# Patient Record
Sex: FEMALE | Race: WHITE | NOT HISPANIC OR LATINO | URBAN - NONMETROPOLITAN AREA
[De-identification: names, ages, dates, MRNs, and addresses within clinical notes are randomized per-mention and may not be internally consistent; named-entity substitution may affect disease eponyms.]

---

## 2019-02-21 ENCOUNTER — IMPORTED ENCOUNTER (OUTPATIENT)
Dept: URBAN - NONMETROPOLITAN AREA CLINIC 1 | Facility: CLINIC | Age: 69
End: 2019-02-21

## 2019-02-21 PROBLEM — H33.8: Noted: 2019-02-21

## 2019-02-21 PROBLEM — H35.413: Noted: 2019-02-21

## 2019-02-21 PROBLEM — H04.123: Noted: 2019-02-21

## 2019-02-21 PROBLEM — H43.813: Noted: 2019-02-21

## 2019-02-21 PROBLEM — H25.13: Noted: 2019-02-21

## 2019-02-21 PROBLEM — H52.4: Noted: 2019-02-21

## 2019-02-21 PROCEDURE — S0621 ROUTINE OPHTHALMOLOGICAL EXA: HCPCS

## 2019-02-21 PROCEDURE — 92310 CONTACT LENS FITTING OU: CPT

## 2019-02-21 NOTE — PATIENT DISCUSSION
Presbyopia OU-  Discussed refractive status in detail with patient. -  New glasses and contact Rx given today. -  Discussed proper lens care replacement and hygiene. Cataracts OU:-  Educated patient on findings today. -  Mild progression noted but not visually signiifcant. -  No treatment indicated at this time. -  Will continue to monitor for now. 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 -  Appears to be stable at this time without changes. -  Signs/symptoms of RD discussed with patient today. She is to call or come in ASAP if changes are noted from today. -  Continue to monitor closely. -  Need to obtain Optos next visit. PVD OU:-  Discussed findings of exam in detail with the patient. -  The risk of retinal detachment in patients with PVDs was discussed with the patient and the warning signs of retinal detachment were carefully reviewed with the patient. Patient to call or come in ASAP if changes are noted from today's vision. -  The patient was warned to return to the office or contact the ophthalmologist on call immediately if they experience signs of retinal detachment. STEW OU -  Discussed diagnosis in detail with patient-  Continue Lotemax before putting in contacts and after takingcontacts out-  Continue Soothe OU as needed-  Continue to monitor; 's Notes:  02/21/19DFE  01/11/16

## 2020-02-11 ENCOUNTER — IMPORTED ENCOUNTER (OUTPATIENT)
Dept: URBAN - NONMETROPOLITAN AREA CLINIC 1 | Facility: CLINIC | Age: 70
End: 2020-02-11

## 2020-02-11 PROCEDURE — S0621 ROUTINE OPHTHALMOLOGICAL EXA: HCPCS

## 2020-02-11 PROCEDURE — 92310 CONTACT LENS FITTING OU: CPT

## 2020-02-11 NOTE — PATIENT DISCUSSION
Presbyopia OU-  Discussed refractive status in detail with patient. -  New glasses and contact Rx given today. -  Discussed proper lens care replacement and hygiene. Cataracts OU:-  Educated patient on findings today. -  Mild progression noted but not visually signiifcant. -  No treatment indicated at this time. -  Will continue to monitor for now. 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 -  Appears to be stable at this time without changes. -  Signs/symptoms of RD discussed with patient today. She is to call or come in ASAP if changes are noted from today. -  Continue to monitor closely. -  Need to obtain Optos next visit. PVD OU:-  Discussed findings of exam in detail with the patient. -  The risk of retinal detachment in patients with PVDs was discussed with the patient and the warning signs of retinal detachment were carefully reviewed with the patient. Patient to call or come in ASAP if changes are noted from today's vision. -  The patient was warned to return to the office or contact the ophthalmologist on call immediately if they experience signs of retinal detachment. STEW OU -  Discussed diagnosis in detail with patient-  Continue Lotemax before putting in contacts and after takingcontacts out-  Continue Soothe OU as needed-  Continue to monitor; 's Notes: MR 02/11/2020DFE  01/11/16

## 2020-09-16 ENCOUNTER — IMPORTED ENCOUNTER (OUTPATIENT)
Dept: URBAN - NONMETROPOLITAN AREA CLINIC 1 | Facility: CLINIC | Age: 70
End: 2020-09-16

## 2020-09-16 PROCEDURE — 92014 COMPRE OPH EXAM EST PT 1/>: CPT

## 2020-09-16 NOTE — PATIENT DISCUSSION
Cataract(s)-Visually significant cataract OU .-Cataract(s) causing symptomatic impairment of visual function not correctable with a tolerable change in glasses or contact lenses lighting or non-operative means resulting in specific activity limitations and/or participation restrictions including but not limited to reading viewing television driving or meeting vocational or recreational needs. -Expectation is clearer vision and functional improvement in symptoms as well as reduced glare disability after cataract removal.-Order IOLMaster and OPD today. -Recommend Fidencio/Trad based on today's OPD testing and lifestyle questionnaire.-All questions were answered regarding surgery including pre and post-op medications appointments activity restrictions and anesthetic usage.-The risks benefits and alternatives and special risk factors for the patient were discussed in detail including but not limited to: bleeding infection retinal detachment vitreous loss problems with the implant and possible need for additional surgery.-Although rare the possibility of complete vision loss was discussed.-The possible need for glasses post-operatively was discussed.-Order medical clearance exam based on history of age -Patient elects to proceed with cataract surgery OD . Will schedule at patient's convenience and re-evaluate OS  in the future. Discussed all lens options.  She elects Stand/Trad; 's Notes: MR 02/11/2020DFE  01/11/16

## 2020-09-28 PROBLEM — H25.13: Noted: 2020-09-28

## 2020-09-28 PROBLEM — H33.8: Noted: 2020-12-29

## 2020-10-30 ENCOUNTER — IMPORTED ENCOUNTER (OUTPATIENT)
Dept: URBAN - NONMETROPOLITAN AREA CLINIC 1 | Facility: CLINIC | Age: 70
End: 2020-10-30

## 2020-10-30 PROBLEM — M19.90: Noted: 2020-10-30

## 2020-10-30 PROBLEM — Z01.818: Noted: 2020-10-30

## 2020-10-30 PROBLEM — K21.9: Noted: 2020-10-30

## 2020-10-30 PROCEDURE — 99214 OFFICE O/P EST MOD 30 MIN: CPT

## 2020-10-30 NOTE — PATIENT DISCUSSION
Medical Clearance-Medical clearance done today. -No outstanding concerns that would preclude surgery.-Patient is cleared to proceed with scheduled surgery.; 's Notes: MR 02/11/2020DFE  01/11/16

## 2020-11-10 ENCOUNTER — IMPORTED ENCOUNTER (OUTPATIENT)
Dept: URBAN - NONMETROPOLITAN AREA CLINIC 1 | Facility: CLINIC | Age: 70
End: 2020-11-10

## 2020-11-10 PROCEDURE — 99024 POSTOP FOLLOW-UP VISIT: CPT

## 2020-11-10 PROCEDURE — 66984 XCAPSL CTRC RMVL W/O ECP: CPT

## 2020-11-10 PROCEDURE — 92136 OPHTHALMIC BIOMETRY: CPT

## 2020-11-10 PROCEDURE — 66999 UNLISTED PX ANT SEGMENT EYE: CPT

## 2020-11-10 NOTE — PATIENT DISCUSSION
Cataract(s)-Visually significant cataract OS . -Cataract(s) causing symptomatic impairment of visual function not correctable with a tolerable change in glasses or contact lenses lighting or non-operative means resulting in specific activity limitations and/or participation restrictions including but not limited to reading viewing television driving or meeting vocational or recreational needs. -Expectation is clearer vision and functional improvement in symptoms as well as reduced glare disability after cataract removal.-Recommend Stand w/ Limbal Relaxing Incisions based on previous OPD testing and lifestyle questionnaire.-All questions were answered regarding surgery including pre and post-op medications appointments activity restrictions and anesthetic usage.-The risks benefits and alternatives and special risk factors for the patient were discussed in detail including but not limited to: bleeding infection retinal detachment vitreous loss problems with the implant and possible need for additional surgery.-Although rare the possibility of complete vision loss was discussed.-The need for glasses post-operatively was discussed.-Patient elects to proceed with cataract surgery OS . Will schedule at patient's convenience. s/p PCIOLSame day POV CE OD Stand w/ LRI 11/10/20 -Pt doing well s/p PCIOL. -Continue post-op gtts according to instruction sheet and sleep with eye shield over eye for 7 nights.-Avoid bending at the waist lifting anything over 5lbs and dirty or ivan environments.; 's Notes: MR 02/11/2020DFE  01/11/16

## 2020-11-11 PROBLEM — Z98.41: Noted: 2020-11-11

## 2020-11-11 PROBLEM — H25.12: Noted: 2020-11-11

## 2020-11-20 ENCOUNTER — IMPORTED ENCOUNTER (OUTPATIENT)
Dept: URBAN - NONMETROPOLITAN AREA CLINIC 1 | Facility: CLINIC | Age: 70
End: 2020-11-20

## 2020-11-20 PROBLEM — Z98.41: Noted: 2020-11-11

## 2020-11-20 PROBLEM — Z98.42: Noted: 2020-11-20

## 2020-11-20 PROCEDURE — 99024 POSTOP FOLLOW-UP VISIT: CPT

## 2020-11-20 NOTE — PATIENT DISCUSSION
3 Day POV CE OS 11/17/20 CE OD 11/10/20 Standard LRI- Discussed diagnosis in detail with patient. - Patient doing well and stable. - Continue post op drops as directed. - Continue to monitor. RTC in 3 month for follow up with [facility]

## 2021-02-19 ENCOUNTER — IMPORTED ENCOUNTER (OUTPATIENT)
Dept: URBAN - NONMETROPOLITAN AREA CLINIC 1 | Facility: CLINIC | Age: 71
End: 2021-02-19

## 2021-02-19 PROBLEM — H26.493: Noted: 2021-02-19

## 2021-02-19 PROBLEM — Z96.1: Noted: 2021-02-19

## 2021-02-19 PROBLEM — H04.123: Noted: 2021-02-19

## 2021-02-19 PROCEDURE — 92012 INTRM OPH EXAM EST PATIENT: CPT

## 2021-02-19 NOTE — PATIENT DISCUSSION
P/C IOL OUDiscussed diagnosis in detail with patient. Both intraocular implants in place and stable. Mild PCO noted OU but not visually significant. Continue to monitor. STEW OUDiscussed diagnosis in detail with patient. Trace SPK noted OU. Continue Refresh BID-QID OU. Continue to monitor.

## 2022-03-07 ENCOUNTER — ESTABLISHED PATIENT (OUTPATIENT)
Dept: URBAN - NONMETROPOLITAN AREA CLINIC 1 | Facility: CLINIC | Age: 72
End: 2022-03-07

## 2022-03-07 DIAGNOSIS — H52.4: ICD-10-CM

## 2022-03-07 DIAGNOSIS — Z96.1: ICD-10-CM

## 2022-03-07 DIAGNOSIS — H43.813: ICD-10-CM

## 2022-03-07 PROCEDURE — 99214 OFFICE O/P EST MOD 30 MIN: CPT

## 2022-03-07 PROCEDURE — 92015 DETERMINE REFRACTIVE STATE: CPT

## 2022-03-07 ASSESSMENT — TONOMETRY
OS_IOP_MMHG: 15
OD_IOP_MMHG: 16

## 2022-03-07 ASSESSMENT — VISUAL ACUITY
OD_SC: 20/20-2
OS_SC: 20/25

## 2022-04-09 ASSESSMENT — KERATOMETRY
OD_K1POWER_DIOPTERS: 43.25
OD_K2POWER_DIOPTERS: 43.50
OS_AXISANGLE_DEGREES: 149
OD_K1POWER_DIOPTERS: 43.25
OS_K2POWER_DIOPTERS: 44.75
OS_K1POWER_DIOPTERS: 43.75
OS_K1POWER_DIOPTERS: 44.50
OS_AXISANGLE_DEGREES: 147
OD_AXISANGLE_DEGREES: 007
OS_K2POWER_DIOPTERS: 44.50
OD_K2POWER_DIOPTERS: 43.75
OD_AXISANGLE_DEGREES: 011

## 2022-04-09 ASSESSMENT — VISUAL ACUITY
OD_PAM: 20/20-
OD_CC: 20/70
OD_SC: 20/20
OS_GLARE: 20/60-
OS_SC: 20/25-2
OS_GLARE: 20/60-
OD_SC: 20/30-2
OD_CC: 20/30-2
OS_CC: 20/400
OS_GLARE: 20/60-
OD_SC: 20/25+
OS_AM: 20/20-
OS_CC: 20/30-1
OS_SC: 20/20
OD_GLARE: 20/70+
OS_CC: 20/30+
OD_CC: 20/30
OD_GLARE: 20/70+
OS_SC: 20/25+

## 2022-04-09 ASSESSMENT — TONOMETRY
OS_IOP_MMHG: 13
OS_IOP_MMHG: 17
OS_IOP_MMHG: 12
OS_IOP_MMHG: 16
OD_IOP_MMHG: 12
OD_IOP_MMHG: 16
OD_IOP_MMHG: 16
OS_IOP_MMHG: 13
OD_IOP_MMHG: 13
OD_IOP_MMHG: 16

## 2024-03-11 ENCOUNTER — COMPREHENSIVE EXAM (OUTPATIENT)
Dept: URBAN - NONMETROPOLITAN AREA CLINIC 1 | Facility: CLINIC | Age: 74
End: 2024-03-11

## 2024-03-11 DIAGNOSIS — H52.4: ICD-10-CM

## 2024-03-11 PROCEDURE — 92015 DETERMINE REFRACTIVE STATE: CPT

## 2024-03-11 PROCEDURE — 92014 COMPRE OPH EXAM EST PT 1/>: CPT

## 2024-03-11 ASSESSMENT — VISUAL ACUITY
OU_SC: 20/29-2
OD_SC: 20/40-2
OS_SC: 20/29-1
OD_PH: 20/32

## 2024-03-11 ASSESSMENT — TONOMETRY
OD_IOP_MMHG: 15
OS_IOP_MMHG: 15

## 2024-12-04 ENCOUNTER — CONSULTATION/EVALUATION (OUTPATIENT)
Age: 74
End: 2024-12-04

## 2024-12-04 DIAGNOSIS — H26.493: ICD-10-CM

## 2024-12-04 PROCEDURE — 66821 AFTER CATARACT LASER SURGERY: CPT | Mod: 57,RT

## 2024-12-04 PROCEDURE — 99214 OFFICE O/P EST MOD 30 MIN: CPT

## 2025-03-19 ENCOUNTER — CONSULTATION/EVALUATION (OUTPATIENT)
Age: 75
End: 2025-03-19

## 2025-03-19 DIAGNOSIS — H26.492: ICD-10-CM

## 2025-03-19 PROCEDURE — 66821 AFTER CATARACT LASER SURGERY: CPT | Mod: 57,LT

## 2025-03-19 PROCEDURE — 99214 OFFICE O/P EST MOD 30 MIN: CPT

## 2025-04-07 ENCOUNTER — POST-OP (OUTPATIENT)
Age: 75
End: 2025-04-07

## 2025-04-07 DIAGNOSIS — Z98.890: ICD-10-CM

## 2025-04-07 PROCEDURE — 99024 POSTOP FOLLOW-UP VISIT: CPT
